# Patient Record
Sex: MALE | Race: WHITE | URBAN - METROPOLITAN AREA
[De-identification: names, ages, dates, MRNs, and addresses within clinical notes are randomized per-mention and may not be internally consistent; named-entity substitution may affect disease eponyms.]

---

## 2023-04-05 ENCOUNTER — OFFICE VISIT (OUTPATIENT)
Facility: LOCATION | Age: 63
End: 2023-04-05
Payer: COMMERCIAL

## 2023-04-05 DIAGNOSIS — H16.223 KERATOCONJUNCT SICCA, NOT SPECIFIED AS SJOGREN'S, BILATERAL: ICD-10-CM

## 2023-04-05 DIAGNOSIS — H04.123 DRY EYE SYNDROME OF BILATERAL LACRIMAL GLANDS: Primary | ICD-10-CM

## 2023-04-05 DIAGNOSIS — H25.813 COMBINED FORMS OF AGE-RELATED CATARACT, BILATERAL: ICD-10-CM

## 2023-04-05 PROCEDURE — 92134 CPTRZ OPH DX IMG PST SGM RTA: CPT | Performed by: OPHTHALMOLOGY

## 2023-04-05 RX ORDER — FLUTICASONE PROPIONATE 93 UG/1
SPRAY, METERED NASAL AS DIRECTED
Qty: 16 | Refills: 0 | Status: ACTIVE
Start: 2023-04-05

## 2023-04-05 RX ORDER — CELECOXIB 200 MG/1
200 MG CAPSULE ORAL
Refills: 0 | Status: ACTIVE
Start: 2023-04-05

## 2023-04-05 RX ORDER — FLUOROMETHOLONE ACETATE 1 MG/ML
0.1 % SUSPENSION/ DROPS OPHTHALMIC
Qty: 5 | Refills: 0 | Status: ACTIVE
Start: 2023-04-05

## 2023-04-05 RX ORDER — GABAPENTIN 300 MG/1
300 MG CAPSULE ORAL AS DIRECTED
Qty: 180 | Refills: 0 | Status: ACTIVE
Start: 2023-04-05

## 2023-04-05 RX ORDER — CYCLOSPORINE 0 G/ML
0.09 % SOLUTION/ DROPS OPHTHALMIC; TOPICAL
Qty: 90 | Refills: 3 | Status: ACTIVE
Start: 2023-04-05

## 2023-04-05 RX ORDER — TADALAFIL 5 MG/1
5 MG TABLET, FILM COATED ORAL
Refills: 0 | Status: ACTIVE
Start: 2023-04-05

## 2023-04-05 ASSESSMENT — INTRAOCULAR PRESSURE
OS: 19
OD: 20

## 2023-04-05 ASSESSMENT — VISUAL ACUITY
OD: 20/30
OS: 20/30

## 2023-04-05 NOTE — IMPRESSION/PLAN
Impression: Combined forms of age-related cataract, bilateral: H25.813. Patient complains of decreased vision. Refraction done today, patient can be corrected to 20/30 OU. Ordered MAC OCT OU today. Testing is WNL. Plan: No surgery indicated at this time. Patient to see his OD for new glasses rx.

## 2023-04-05 NOTE — IMPRESSION/PLAN
Impression: Keratoconjunct sicca, not specified as Sjogren's, bilateral: M72.689. Plan: Refer to dry eye plan.

## 2023-04-05 NOTE — IMPRESSION/PLAN
Impression: Dry eye syndrome of bilateral lacrimal glands: H04.123. Snug plugs BLL in place. Patient complains of burning. Replaced extended plugs BUL today. Patient tolerated the procedure well. Plan: Patient to continue PFATs QID OU. Start Cequa BID OU -sample given and rx sent to [facility]. Start Flarex QID OU x 4 weeks -sample given and rx sent to [facility]. RTC in 5 months for re-evaluation with Dr. Hansa Velazquez.

## 2023-08-30 ENCOUNTER — OFFICE VISIT (OUTPATIENT)
Facility: LOCATION | Age: 63
End: 2023-08-30
Payer: MEDICARE

## 2023-08-30 DIAGNOSIS — H16.223 KERATOCONJUNCT SICCA, NOT SPECIFIED AS SJOGREN'S, BILATERAL: ICD-10-CM

## 2023-08-30 DIAGNOSIS — H25.813 COMBINED FORMS OF AGE-RELATED CATARACT, BILATERAL: ICD-10-CM

## 2023-08-30 DIAGNOSIS — H04.123 DRY EYE SYNDROME OF BILATERAL LACRIMAL GLANDS: Primary | ICD-10-CM

## 2023-08-30 PROCEDURE — 99213 OFFICE O/P EST LOW 20 MIN: CPT | Performed by: OPHTHALMOLOGY

## 2023-08-30 RX ORDER — LOTEPREDNOL ETABONATE 5 MG/ML
0.5 % SUSPENSION/ DROPS OPHTHALMIC
Qty: 5 | Refills: 5 | Status: ACTIVE
Start: 2023-08-30

## 2023-08-30 RX ORDER — LOTEPREDNOL ETABONATE 2.5 MG/ML
0.25 % SUSPENSION/ DROPS OPHTHALMIC
Qty: 8.3 | Refills: 4 | Status: ACTIVE
Start: 2023-08-30

## 2023-08-30 ASSESSMENT — INTRAOCULAR PRESSURE
OD: 17
OS: 19

## 2023-10-04 ENCOUNTER — OFFICE VISIT (OUTPATIENT)
Facility: LOCATION | Age: 63
End: 2023-10-04
Payer: MEDICARE

## 2023-10-04 DIAGNOSIS — H16.223 KERATOCONJUNCT SICCA, NOT SPECIFIED AS SJOGREN'S, BILATERAL: ICD-10-CM

## 2023-10-04 DIAGNOSIS — H04.123 DRY EYE SYNDROME OF BILATERAL LACRIMAL GLANDS: Primary | ICD-10-CM

## 2023-10-04 PROCEDURE — 68761 CLOSE TEAR DUCT OPENING: CPT | Performed by: OPHTHALMOLOGY

## 2023-10-04 RX ORDER — CYCLOSPORINE 0.5 MG/ML
0.05 % EMULSION OPHTHALMIC
Qty: 90 | Refills: 3 | Status: ACTIVE
Start: 2023-10-04

## 2023-10-04 ASSESSMENT — INTRAOCULAR PRESSURE
OD: 24
OS: 24

## 2024-01-10 ENCOUNTER — OFFICE VISIT (OUTPATIENT)
Facility: LOCATION | Age: 64
End: 2024-01-10
Payer: MEDICARE

## 2024-01-10 DIAGNOSIS — H16.223 KERATOCONJUNCT SICCA, NOT SPECIFIED AS SJOGREN'S, BILATERAL: ICD-10-CM

## 2024-01-10 PROCEDURE — 99213 OFFICE O/P EST LOW 20 MIN: CPT | Performed by: OPHTHALMOLOGY

## 2024-01-10 RX ORDER — DORZOLAMIDE HYDROCHLORIDE AND TIMOLOL MALEATE 20; 5 MG/ML; MG/ML
SOLUTION/ DROPS OPHTHALMIC
Qty: 5 | Refills: 1 | Status: ACTIVE
Start: 2024-01-10

## 2024-01-10 ASSESSMENT — INTRAOCULAR PRESSURE
OD: 32
OS: 32
OD: 30
OS: 28

## 2024-01-10 ASSESSMENT — VISUAL ACUITY
OD: 20/25
OS: 20/30

## 2024-01-31 ENCOUNTER — OFFICE VISIT (OUTPATIENT)
Facility: LOCATION | Age: 64
End: 2024-01-31
Payer: MEDICARE

## 2024-01-31 DIAGNOSIS — H04.123 DRY EYE SYNDROME OF BILATERAL LACRIMAL GLANDS: ICD-10-CM

## 2024-01-31 DIAGNOSIS — H40.043 STEROID RESPONDER, BILATERAL: Primary | ICD-10-CM

## 2024-01-31 PROCEDURE — 68761 CLOSE TEAR DUCT OPENING: CPT | Performed by: OPHTHALMOLOGY

## 2024-01-31 PROCEDURE — 99213 OFFICE O/P EST LOW 20 MIN: CPT | Performed by: OPHTHALMOLOGY

## 2024-01-31 ASSESSMENT — INTRAOCULAR PRESSURE
OS: 19
OD: 19

## 2024-06-26 ENCOUNTER — OFFICE VISIT (OUTPATIENT)
Facility: LOCATION | Age: 64
End: 2024-06-26
Payer: MEDICARE

## 2024-06-26 DIAGNOSIS — H16.223 KERATOCONJUNCT SICCA, NOT SPECIFIED AS SJOGREN'S, BILATERAL: ICD-10-CM

## 2024-06-26 DIAGNOSIS — H25.813 COMBINED FORMS OF AGE-RELATED CATARACT, BILATERAL: ICD-10-CM

## 2024-06-26 DIAGNOSIS — H04.123 DRY EYE SYNDROME OF BILATERAL LACRIMAL GLANDS: Primary | ICD-10-CM

## 2024-06-26 DIAGNOSIS — H40.043 STEROID RESPONDER, BILATERAL: ICD-10-CM

## 2024-06-26 RX ORDER — PERFLUOROHEXYLOCTANE 1 MG/MG
100 % SOLUTION OPHTHALMIC
Qty: 3 | Refills: 3 | Status: ACTIVE
Start: 2024-06-26

## 2024-06-26 ASSESSMENT — INTRAOCULAR PRESSURE
OS: 18
OD: 18

## 2024-11-21 ENCOUNTER — OFFICE VISIT (OUTPATIENT)
Facility: LOCATION | Age: 64
End: 2024-11-21
Payer: MEDICARE

## 2024-11-21 DIAGNOSIS — H25.813 COMBINED FORMS OF AGE-RELATED CATARACT, BILATERAL: ICD-10-CM

## 2024-11-21 DIAGNOSIS — H40.043 STEROID RESPONDER, BILATERAL: ICD-10-CM

## 2024-11-21 DIAGNOSIS — H04.123 DRY EYE SYNDROME OF BILATERAL LACRIMAL GLANDS: Primary | ICD-10-CM

## 2024-11-21 DIAGNOSIS — H16.223 KERATOCONJUNCT SICCA, NOT SPECIFIED AS SJOGREN'S, BILATERAL: ICD-10-CM

## 2024-11-21 PROCEDURE — 68761 CLOSE TEAR DUCT OPENING: CPT | Performed by: OPHTHALMOLOGY

## 2024-11-21 ASSESSMENT — INTRAOCULAR PRESSURE
OD: 19
OS: 19

## 2025-04-17 ENCOUNTER — OFFICE VISIT (OUTPATIENT)
Facility: LOCATION | Age: 65
End: 2025-04-17
Payer: MEDICARE

## 2025-04-17 DIAGNOSIS — H40.043 STEROID RESPONDER, BILATERAL: ICD-10-CM

## 2025-04-17 DIAGNOSIS — H04.123 DRY EYE SYNDROME OF BILATERAL LACRIMAL GLANDS: Primary | ICD-10-CM

## 2025-04-17 DIAGNOSIS — H16.223 KERATOCONJUNCT SICCA, NOT SPECIFIED AS SJOGREN'S, BILATERAL: ICD-10-CM

## 2025-04-17 DIAGNOSIS — H25.813 COMBINED FORMS OF AGE-RELATED CATARACT, BILATERAL: ICD-10-CM

## 2025-04-17 PROCEDURE — 99214 OFFICE O/P EST MOD 30 MIN: CPT | Performed by: OPHTHALMOLOGY

## 2025-04-17 RX ORDER — OFLOXACIN 3 MG/ML
0.3 % SOLUTION/ DROPS OPHTHALMIC
Qty: 5 | Refills: 1 | Status: ACTIVE
Start: 2025-04-17

## 2025-04-17 RX ORDER — KETOROLAC TROMETHAMINE 5 MG/ML
0.5 % SOLUTION OPHTHALMIC
Qty: 10 | Refills: 1 | Status: ACTIVE
Start: 2025-04-17

## 2025-04-17 RX ORDER — PREDNISOLONE ACETATE 10 MG/ML
1 % SUSPENSION/ DROPS OPHTHALMIC
Qty: 10 | Refills: 1 | Status: ACTIVE
Start: 2025-04-17

## 2025-04-17 ASSESSMENT — VISUAL ACUITY
OD: 20/25
OS: 20/25

## 2025-04-17 ASSESSMENT — INTRAOCULAR PRESSURE
OD: 17
OS: 17

## 2025-05-08 ENCOUNTER — OFFICE VISIT (OUTPATIENT)
Facility: LOCATION | Age: 65
End: 2025-05-08
Payer: MEDICARE

## 2025-05-08 DIAGNOSIS — H16.223 KERATOCONJUNCT SICCA, NOT SPECIFIED AS SJOGREN'S, BILATERAL: ICD-10-CM

## 2025-05-08 DIAGNOSIS — H25.813 COMBINED FORMS OF AGE-RELATED CATARACT, BILATERAL: Primary | ICD-10-CM

## 2025-05-08 DIAGNOSIS — H40.043 STEROID RESPONDER, BILATERAL: ICD-10-CM

## 2025-05-08 DIAGNOSIS — H04.123 DRY EYE SYNDROME OF BILATERAL LACRIMAL GLANDS: ICD-10-CM

## 2025-05-08 PROCEDURE — 92136 OPHTHALMIC BIOMETRY: CPT | Performed by: OPHTHALMOLOGY

## 2025-05-08 PROCEDURE — 99214 OFFICE O/P EST MOD 30 MIN: CPT | Performed by: OPHTHALMOLOGY

## 2025-05-08 RX ORDER — PREDNISOLONE ACETATE 10 MG/ML
1 % SUSPENSION/ DROPS OPHTHALMIC
Qty: 10 | Refills: 1 | Status: INACTIVE
Start: 2025-05-08 | End: 2025-05-08

## 2025-05-08 RX ORDER — OFLOXACIN 3 MG/ML
0.3 % SOLUTION/ DROPS OPHTHALMIC
Qty: 5 | Refills: 1 | Status: INACTIVE
Start: 2025-05-08 | End: 2025-05-08

## 2025-05-08 RX ORDER — CYCLOSPORINE 0.5 MG/ML
0.05 % EMULSION OPHTHALMIC
Qty: 180 | Refills: 3 | Status: INACTIVE
Start: 2025-05-08 | End: 2026-05-07

## 2025-05-08 RX ORDER — KETOROLAC TROMETHAMINE 5 MG/ML
0.5 % SOLUTION OPHTHALMIC
Qty: 10 | Refills: 1 | Status: ACTIVE
Start: 2025-05-08

## 2025-05-08 ASSESSMENT — INTRAOCULAR PRESSURE
OD: 16
OS: 17

## 2025-06-23 ENCOUNTER — Encounter (OUTPATIENT)
Facility: LOCATION | Age: 65
End: 2025-06-23
Payer: MEDICARE

## 2025-06-23 ENCOUNTER — PROCEDURE (OUTPATIENT)
Facility: LOCATION | Age: 65
End: 2025-06-23
Payer: MEDICARE

## 2025-06-23 PROCEDURE — PR2PB PR2PB: CUSTOM | Performed by: OPHTHALMOLOGY

## 2025-06-23 PROCEDURE — ASCR2 ASCR2: CUSTOM | Performed by: CLINIC/CENTER

## 2025-06-23 PROCEDURE — 66984 XCAPSL CTRC RMVL W/O ECP: CPT | Performed by: OPHTHALMOLOGY

## 2025-06-24 ENCOUNTER — POST-OPERATIVE VISIT (OUTPATIENT)
Facility: LOCATION | Age: 65
End: 2025-06-24

## 2025-06-24 PROCEDURE — 99024 POSTOP FOLLOW-UP VISIT: CPT | Performed by: OPHTHALMOLOGY

## 2025-06-24 ASSESSMENT — INTRAOCULAR PRESSURE
OD: 25
OD: 24
OS: 11
OS: 12

## 2025-06-27 ENCOUNTER — POST-OPERATIVE VISIT (OUTPATIENT)
Facility: LOCATION | Age: 65
End: 2025-06-27

## 2025-06-27 DIAGNOSIS — Z48.810 ENCOUNTER FOR SURGICAL AFTERCARE FOLLOWING SURGERY ON A SENSE ORGAN: Primary | ICD-10-CM

## 2025-06-27 DIAGNOSIS — H25.12 AGE-RELATED NUCLEAR CATARACT, LEFT EYE: ICD-10-CM

## 2025-06-27 PROCEDURE — 99024 POSTOP FOLLOW-UP VISIT: CPT | Performed by: OPHTHALMOLOGY

## 2025-06-27 ASSESSMENT — INTRAOCULAR PRESSURE
OD: 23
OS: 18

## 2025-06-27 ASSESSMENT — VISUAL ACUITY: OD: 20/20

## 2025-07-07 ENCOUNTER — PROCEDURE (OUTPATIENT)
Facility: LOCATION | Age: 65
End: 2025-07-07
Payer: MEDICARE

## 2025-07-07 ENCOUNTER — Encounter (OUTPATIENT)
Facility: LOCATION | Age: 65
End: 2025-07-07
Payer: MEDICARE

## 2025-07-07 PROCEDURE — ASCR2 ASCR2: CUSTOM | Performed by: CLINIC/CENTER

## 2025-07-07 PROCEDURE — 66984 XCAPSL CTRC RMVL W/O ECP: CPT | Performed by: OPHTHALMOLOGY

## 2025-07-07 PROCEDURE — PR2PB PR2PB: CUSTOM | Performed by: OPHTHALMOLOGY

## 2025-07-08 ENCOUNTER — POST-OPERATIVE VISIT (OUTPATIENT)
Facility: LOCATION | Age: 65
End: 2025-07-08

## 2025-07-08 DIAGNOSIS — Z48.810 ENCOUNTER FOR SURGICAL AFTERCARE FOLLOWING SURGERY ON A SENSE ORGAN: Primary | ICD-10-CM

## 2025-07-08 PROCEDURE — 99024 POSTOP FOLLOW-UP VISIT: CPT | Performed by: OPHTHALMOLOGY

## 2025-07-08 RX ORDER — BRIMONIDINE TARTRATE, TIMOLOL MALEATE 2; 5 MG/ML; MG/ML
SOLUTION/ DROPS OPHTHALMIC
Qty: 10 | Refills: 1 | Status: ACTIVE
Start: 2025-07-08

## 2025-07-08 ASSESSMENT — INTRAOCULAR PRESSURE
OS: 22
OD: 16

## 2025-07-11 ENCOUNTER — POST-OPERATIVE VISIT (OUTPATIENT)
Facility: LOCATION | Age: 65
End: 2025-07-11

## 2025-07-11 DIAGNOSIS — Z96.1 PRESENCE OF INTRAOCULAR LENS: Primary | ICD-10-CM

## 2025-07-11 ASSESSMENT — INTRAOCULAR PRESSURE
OS: 16
OD: 16

## 2025-07-11 ASSESSMENT — VISUAL ACUITY
OS: 20/20
OD: 20/20

## 2025-08-01 ENCOUNTER — POST-OPERATIVE VISIT (OUTPATIENT)
Facility: LOCATION | Age: 65
End: 2025-08-01

## 2025-08-01 DIAGNOSIS — Z48.810 ENCOUNTER FOR SURGICAL AFTERCARE FOLLOWING SURGERY ON THE SENSE ORGANS: Primary | ICD-10-CM

## 2025-08-01 PROCEDURE — 99024 POSTOP FOLLOW-UP VISIT: CPT | Performed by: OPHTHALMOLOGY

## 2025-08-01 ASSESSMENT — INTRAOCULAR PRESSURE
OS: 13
OD: 13